# Patient Record
Sex: MALE | Race: WHITE | NOT HISPANIC OR LATINO | Employment: UNEMPLOYED | URBAN - METROPOLITAN AREA
[De-identification: names, ages, dates, MRNs, and addresses within clinical notes are randomized per-mention and may not be internally consistent; named-entity substitution may affect disease eponyms.]

---
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
## 2021-04-19 DIAGNOSIS — Z23 ENCOUNTER FOR IMMUNIZATION: ICD-10-CM

## 2022-11-17 ENCOUNTER — OFFICE VISIT (OUTPATIENT)
Dept: URGENT CARE | Facility: MEDICAL CENTER | Age: 22
End: 2022-11-17

## 2022-11-17 VITALS
HEART RATE: 102 BPM | BODY MASS INDEX: 28.47 KG/M2 | HEIGHT: 75 IN | WEIGHT: 229 LBS | RESPIRATION RATE: 18 BRPM | OXYGEN SATURATION: 96 % | SYSTOLIC BLOOD PRESSURE: 148 MMHG | DIASTOLIC BLOOD PRESSURE: 79 MMHG | TEMPERATURE: 99.1 F

## 2022-11-17 DIAGNOSIS — J11.1 INFLUENZA: Primary | ICD-10-CM

## 2022-11-17 NOTE — PROGRESS NOTES
[facility]        NAME: Leonard Jo is a 25 y o  male  : 2000    MRN: [id number]  DATE: 2022  TIME: 10:37 AM    Assessment and Plan   Influenza [J11 1]  1  Influenza  Covid19 and INFLUENZA A/B PCR            Patient Instructions     1  Increase fluids  2  Motrin as needed for fever, body aches  3  Isolation until symptoms improve  4  Follow up with PCP in 3-5 days if symptoms persist       Chief Complaint     Chief Complaint   Patient presents with   • Cold Like Symptoms     Pt  C/O body aches, fever, cough and fatigue for the past 2 days  Attends [employer] where there is a reported influenza A outbreak  History of Present Illness       Sara Hugo is a 20-year-old male presents with a 3 day history of fatigue, chills, body aches, fever and cough  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, he reports no nausea, vomiting or diarrhea since the onset of his illness  Patient took a home COVID test 1 day prior which was read as negative  Review of Systems   Review of Systems   Constitutional: Positive for chills, fatigue and fever  HENT: Positive for congestion, postnasal drip and rhinorrhea  Respiratory: Positive for cough  Gastrointestinal: Negative  Current Medications     No current outpatient medications on file  Current Allergies     Allergies as of 2022   • (No Known Allergies)            The following portions of the patient's history were reviewed and updated as appropriate: allergies, current medications, past family history, past medical history, past social history, past surgical history and problem list      No past medical history on file  No past surgical history on file  No family history on file  Medications have been verified          Objective   /79   Pulse 102   Temp 99 1 °F (37 3 °C)   Resp 18   Ht 6' 3" (1 905 m)   Wt 104 kg (229 lb)   SpO2 96%   BMI 28 62 kg/m²   No LMP for male patient  Physical Exam     Physical Exam  Constitutional:       General: He is not in acute distress  Appearance: Normal appearance  He is not ill-appearing  HENT:      Head: Normocephalic and atraumatic  Right Ear: Tympanic membrane and ear canal normal       Left Ear: Tympanic membrane and ear canal normal       Nose: Congestion and rhinorrhea present  Rhinorrhea is clear  Mouth/Throat:      Lips: Pink  Pharynx: Oropharynx is clear  Cardiovascular:      Rate and Rhythm: Normal rate and regular rhythm  Heart sounds: Normal heart sounds, S1 normal and S2 normal  No murmur heard  Pulmonary:      Effort: Pulmonary effort is normal       Breath sounds: Normal breath sounds and air entry  No decreased breath sounds, wheezing, rhonchi or rales  Neurological:      Mental Status: He is alert

## 2022-11-17 NOTE — LETTER
November 17, 2022     Patient: Radha James   YOB: 2000   Date of Visit: 11/17/2022       To Whom it May Concern:    Radha James was seen in my clinic on 11/17/2022  He may return to work/school on 11/21/22  If you have any questions or concerns, please don't hesitate to call           Sincerely,          Miguel Faustin, PABritanyC        CC: No Recipients

## 2022-11-17 NOTE — PATIENT INSTRUCTIONS
1  Increase fluids  2  Motrin as needed for fever, body aches  3  Isolation until symptoms improve  4   Follow up with PCP in 3-5 days if symptoms persist

## 2022-11-18 LAB
FLUAV RNA RESP QL NAA+PROBE: POSITIVE
FLUBV RNA RESP QL NAA+PROBE: NEGATIVE
SARS-COV-2 RNA RESP QL NAA+PROBE: NEGATIVE

## 2022-11-19 ENCOUNTER — TELEPHONE (OUTPATIENT)
Dept: URGENT CARE | Facility: MEDICAL CENTER | Age: 22
End: 2022-11-19

## 2022-11-19 NOTE — TELEPHONE ENCOUNTER
Left message on patient's cellphone notifying a positive influenza a test results    Advised follow-up if symptoms worsen or persist

## 2022-11-25 ENCOUNTER — OFFICE VISIT (OUTPATIENT)
Dept: URGENT CARE | Facility: MEDICAL CENTER | Age: 22
End: 2022-11-25

## 2022-11-25 VITALS
TEMPERATURE: 97.7 F | SYSTOLIC BLOOD PRESSURE: 142 MMHG | BODY MASS INDEX: 28.65 KG/M2 | OXYGEN SATURATION: 95 % | RESPIRATION RATE: 20 BRPM | DIASTOLIC BLOOD PRESSURE: 83 MMHG | WEIGHT: 229.2 LBS | HEART RATE: 66 BPM

## 2022-11-25 DIAGNOSIS — R05.1 ACUTE COUGH: Primary | ICD-10-CM

## 2022-11-25 RX ORDER — AZITHROMYCIN 250 MG/1
TABLET, FILM COATED ORAL
Qty: 6 TABLET | Refills: 0 | Status: SHIPPED | OUTPATIENT
Start: 2022-11-25 | End: 2022-11-29

## 2022-11-25 RX ORDER — BENZONATATE 100 MG/1
100 CAPSULE ORAL 3 TIMES DAILY PRN
Qty: 20 CAPSULE | Refills: 0 | Status: SHIPPED | OUTPATIENT
Start: 2022-11-25

## 2022-11-25 NOTE — PROGRESS NOTES
[facility]        NAME: Opal Fay is a 25 y o  male  : 2000    MRN: [id number]  DATE: 2022  TIME: 12:37 PM    Assessment and Plan   Acute cough [R05 1]  1  Acute cough  azithromycin (ZITHROMAX) 250 mg tablet    benzonatate (TESSALON PERLES) 100 mg capsule            Patient Instructions     Cough  Zithromax as directed   Tessalon Perles as needed for cough  Follow up with PCP in 3-5 days  Proceed to  ER if symptoms worsen  Chief Complaint     Chief Complaint   Patient presents with   • Cough     Chest congestion, SOB, pain when taking deep breaths  Pt reports he had the flu 1 week ago, but his cough has gotten worse  Pt reports SOB and pain during breaths x 2 days         History of Present Illness       20-year-old male who was recently seen for cough and diagnosed with influenza present 7 days later complaining of cough having worsened and now being productive of yellow/green sputum over the last 2 days  Patient states he has been using over-the-counter medications with no relief  Denies chest pain, shortness off breath      Review of Systems   Review of Systems   Constitutional: Negative  HENT: Positive for congestion and sore throat  Negative for dental problem, drooling, ear pain, postnasal drip, rhinorrhea, sinus pain, trouble swallowing and voice change  Eyes: Negative  Respiratory: Positive for cough  Negative for apnea, choking, chest tightness, shortness of breath, wheezing and stridor  Cardiovascular: Negative  Negative for chest pain           Current Medications       Current Outpatient Medications:   •  azithromycin (ZITHROMAX) 250 mg tablet, Take 2 tablets today then 1 tablet daily x 4 days, Disp: 6 tablet, Rfl: 0  •  benzonatate (TESSALON PERLES) 100 mg capsule, Take 1 capsule (100 mg total) by mouth 3 (three) times a day as needed for cough, Disp: 20 capsule, Rfl: 0    Current Allergies     Allergies as of 2022   • (No Known Allergies)            The following portions of the patient's history were reviewed and updated as appropriate: allergies, current medications, past family history, past medical history, past social history, past surgical history and problem list      No past medical history on file  No past surgical history on file  No family history on file  Medications have been verified  Objective   /83   Pulse 66   Temp 97 7 °F (36 5 °C)   Resp 20   Wt 104 kg (229 lb 3 2 oz)   SpO2 95%   BMI 28 65 kg/m²        Physical Exam     Physical Exam  Constitutional:       General: He is not in acute distress  Appearance: Normal appearance  He is well-developed and well-nourished  He is not diaphoretic  HENT:      Head: Normocephalic and atraumatic  Right Ear: Hearing, tympanic membrane, ear canal and external ear normal       Left Ear: Hearing, tympanic membrane, ear canal and external ear normal       Nose: Rhinorrhea present  Right Sinus: No maxillary sinus tenderness or frontal sinus tenderness  Left Sinus: No maxillary sinus tenderness or frontal sinus tenderness  Mouth/Throat:      Mouth: Oropharynx is clear and moist and mucous membranes are normal       Pharynx: Uvula midline  Cardiovascular:      Rate and Rhythm: Normal rate and regular rhythm  Pulses: Intact distal pulses  Heart sounds: Normal heart sounds  Pulmonary:      Effort: Pulmonary effort is normal  No respiratory distress  Breath sounds: Normal breath sounds  No stridor  No wheezing, rhonchi or rales  Chest:      Chest wall: No tenderness  Musculoskeletal:      Cervical back: Normal range of motion and neck supple  Lymphadenopathy:      Cervical: No cervical adenopathy  Neurological:      Mental Status: He is alert

## 2024-02-25 ENCOUNTER — OFFICE VISIT (OUTPATIENT)
Dept: URGENT CARE | Facility: MEDICAL CENTER | Age: 24
End: 2024-02-25
Payer: COMMERCIAL

## 2024-02-25 VITALS
WEIGHT: 229 LBS | TEMPERATURE: 100.6 F | HEART RATE: 112 BPM | OXYGEN SATURATION: 98 % | DIASTOLIC BLOOD PRESSURE: 72 MMHG | SYSTOLIC BLOOD PRESSURE: 122 MMHG | BODY MASS INDEX: 28.47 KG/M2 | HEIGHT: 75 IN | RESPIRATION RATE: 18 BRPM

## 2024-02-25 DIAGNOSIS — R68.89 FLU-LIKE SYMPTOMS: Primary | ICD-10-CM

## 2024-02-25 DIAGNOSIS — J02.9 ACUTE PHARYNGITIS, UNSPECIFIED ETIOLOGY: ICD-10-CM

## 2024-02-25 LAB — S PYO AG THROAT QL: NEGATIVE

## 2024-02-25 PROCEDURE — 99213 OFFICE O/P EST LOW 20 MIN: CPT | Performed by: PHYSICIAN ASSISTANT

## 2024-02-25 PROCEDURE — 87636 SARSCOV2 & INF A&B AMP PRB: CPT | Performed by: PHYSICIAN ASSISTANT

## 2024-02-25 PROCEDURE — 87880 STREP A ASSAY W/OPTIC: CPT | Performed by: PHYSICIAN ASSISTANT

## 2024-02-25 RX ORDER — OSELTAMIVIR PHOSPHATE 75 MG/1
75 CAPSULE ORAL EVERY 12 HOURS SCHEDULED
Qty: 10 CAPSULE | Refills: 0 | Status: SHIPPED | OUTPATIENT
Start: 2024-02-25 | End: 2024-03-01

## 2024-02-25 NOTE — PATIENT INSTRUCTIONS
Increase fluids  Motrin as needed for fever, throat pain  Take Tamiflu 75mg twice daily x 5 days. Stop if flu test neg  Follow-up with PCP if symptoms persist.

## 2024-02-25 NOTE — PROGRESS NOTES
[facility]        NAME: Jose De Jesus La is a 23 y.o. male  : 2000    MRN: [id number]  DATE: 2024  TIME: 4:03 PM    Assessment and Plan   Flu-like symptoms [R68.89]  1. Flu-like symptoms  Covid/Flu- Office Collect Normal    POCT rapid ANTIGEN strepA    Covid/Flu- Office Collect Normal    oseltamivir (TAMIFLU) 75 mg capsule      2. Acute pharyngitis, unspecified etiology              Patient Instructions     Increase fluids  Motrin as needed for fever, throat pain  Take Tamiflu 75mg twice daily x 5 days. Stop if flu test neg  Follow-up with PCP if symptoms persist.       Chief Complaint     Chief Complaint   Patient presents with    Sore Throat     Sore throat, fever, chills, body aches, fatigue, neck pain, nausea, vomiting          History of Present Illness       Jose De Jesus a 23-year-old male who presents with a 2-day history of acute onset fever, chills, body aches nasal congestion sore throat and vomiting.  Patient reports his initial symptoms started with nausea followed by 1 episode of vomiting.  He then went on to have fever, chills and bodyaches.  Patient reports multiple sick contacts with both strep throat and influenza through his work as .    Fever - 9 weeks to 74 years   This is a new problem. The current episode started in the past 7 days. The problem occurs intermittently. The problem has been unchanged. The maximum temperature noted was 101 to 101.9 F. The temperature was taken using an oral thermometer. Associated symptoms include congestion, coughing, muscle aches, nausea, sleepiness, a sore throat and vomiting. Pertinent negatives include no abdominal pain, chest pain, diarrhea, ear pain, headaches, rash or urinary pain.       Review of Systems   Review of Systems   Constitutional:  Positive for fever.   HENT:  Positive for congestion and sore throat. Negative for ear pain.    Respiratory:  Positive for cough.    Cardiovascular:  Negative for chest pain.  "  Gastrointestinal:  Positive for nausea and vomiting. Negative for abdominal pain and diarrhea.   Genitourinary:  Negative for dysuria.   Skin:  Negative for rash.   Neurological:  Negative for headaches.         Current Medications       Current Outpatient Medications:     oseltamivir (TAMIFLU) 75 mg capsule, Take 1 capsule (75 mg total) by mouth every 12 (twelve) hours for 5 days, Disp: 10 capsule, Rfl: 0    benzonatate (TESSALON PERLES) 100 mg capsule, Take 1 capsule (100 mg total) by mouth 3 (three) times a day as needed for cough, Disp: 20 capsule, Rfl: 0    Current Allergies     Allergies as of 02/25/2024    (No Known Allergies)            The following portions of the patient's history were reviewed and updated as appropriate: allergies, current medications, past family history, past medical history, past social history, past surgical history and problem list.     History reviewed. No pertinent past medical history.    History reviewed. No pertinent surgical history.    History reviewed. No pertinent family history.      Medications have been verified.        Objective   /72   Pulse (!) 112   Temp (!) 100.6 °F (38.1 °C)   Resp 18   Ht 6' 3\" (1.905 m)   Wt 104 kg (229 lb)   SpO2 98%   BMI 28.62 kg/m²   No LMP for male patient.       Physical Exam     Physical Exam  Constitutional:       General: He is not in acute distress.     Appearance: He is well-developed. He is not ill-appearing.   HENT:      Head: Normocephalic and atraumatic.      Right Ear: Tympanic membrane and ear canal normal.      Left Ear: Tympanic membrane and ear canal normal.      Nose: Congestion and rhinorrhea present. Rhinorrhea is clear.      Mouth/Throat:      Lips: Pink.      Pharynx: Posterior oropharyngeal erythema present. No oropharyngeal exudate.   Cardiovascular:      Rate and Rhythm: Normal rate and regular rhythm.      Heart sounds: Normal heart sounds, S1 normal and S2 normal. No murmur heard.  Pulmonary:      " Effort: Pulmonary effort is normal.      Breath sounds: Normal breath sounds and air entry.   Neurological:      Mental Status: He is alert.
